# Patient Record
Sex: FEMALE
[De-identification: names, ages, dates, MRNs, and addresses within clinical notes are randomized per-mention and may not be internally consistent; named-entity substitution may affect disease eponyms.]

---

## 2018-01-02 ENCOUNTER — HOSPITAL ENCOUNTER (OUTPATIENT)
Dept: HOSPITAL 45 - C.MAMM | Age: 43
Discharge: HOME | End: 2018-01-02
Attending: OBSTETRICS & GYNECOLOGY
Payer: COMMERCIAL

## 2018-01-02 DIAGNOSIS — Z12.31: Primary | ICD-10-CM

## 2018-01-03 NOTE — MAMMOGRAPHY REPORT
BILATERAL DIGITAL SCREENING MAMMOGRAM TOMOSYNTHESIS WITH CAD: 1/2/2018

CLINICAL HISTORY: Routine screening.  Patient has no complaints.  





TECHNIQUE:  Breast tomosynthesis in addition to standard 2D mammography was performed. Current study 
was also evaluated with a Computer Aided Detection (CAD) system.  



COMPARISON: None



BREAST COMPOSITION:  The tissue of both breasts is heterogeneously dense, which may obscure small mas
ses.  



FINDINGS:  There is an asymmetry in the middle one third of the right breast along the posterior nipp
le line on the CC view, thought to project superiorly based on the MLO view, for which additional spo
t compression tomosynthesis views and possible ultrasound are recommended.  A possible grouping of ca
lcifications in the 6:00 anterior left breast warrant additional spot magnification views.



No other suspicious masses, asymmetries, areas of architectural distortion or suspicious calcificatio
ns are seen bilaterally.



IMPRESSION:  ACR BI-RADS CATEGORY 0: INCOMPLETE EVALUATION:  NEED ADDITIONAL IMAGING EVALUATION

The asymmetry in the middle one third of the right breast along the posterior nipple line on the CC v
iew and microcalcifications in the 6:00 anterior left breast need additional imaging evaluation.



The patient will be called to schedule an appointment.  





Approximately 10% of breast cancers are not detected with mammography. A negative mammographic report
 should not delay biopsy if a clinically suggestive mass is present.



Meghna Haque M.D.          

ay/:1/2/2018 15:55:39  



Imaging Technologist: Marilee ESQUEDA)(ARABELLA), [facility]

letter sent: Addl Imaging 0  

BI-RADS Code: ACR BI-RADS Category 0: Incomplete Evaluation:  Need Additional Imaging Evaluation

## 2018-01-15 ENCOUNTER — HOSPITAL ENCOUNTER (OUTPATIENT)
Dept: HOSPITAL 45 - C.MAMM | Age: 43
Discharge: HOME | End: 2018-01-15
Attending: OBSTETRICS & GYNECOLOGY
Payer: COMMERCIAL

## 2018-01-15 ENCOUNTER — HOSPITAL ENCOUNTER (OUTPATIENT)
Dept: HOSPITAL 45 - C.PAPS | Age: 43
Discharge: HOME | End: 2018-01-15
Attending: OBSTETRICS & GYNECOLOGY
Payer: COMMERCIAL

## 2018-01-15 ENCOUNTER — HOSPITAL ENCOUNTER (OUTPATIENT)
Dept: HOSPITAL 45 - C.LAB1850 | Age: 43
Discharge: HOME | End: 2018-01-15
Attending: OBSTETRICS & GYNECOLOGY
Payer: COMMERCIAL

## 2018-01-15 DIAGNOSIS — Z01.419: Primary | ICD-10-CM

## 2018-01-15 DIAGNOSIS — N64.89: Primary | ICD-10-CM

## 2018-01-15 DIAGNOSIS — N93.9: Primary | ICD-10-CM

## 2018-01-15 DIAGNOSIS — R92.0: ICD-10-CM

## 2018-01-15 LAB
EOSINOPHIL NFR BLD AUTO: 232 K/UL (ref 130–400)
HCT VFR BLD CALC: 36.8 % (ref 37–47)
HGB BLD-MCNC: 12.4 G/DL (ref 12–16)
MCH RBC QN AUTO: 29.6 PG (ref 25–34)
MCHC RBC AUTO-ENTMCNC: 33.7 G/DL (ref 32–36)
MCV RBC AUTO: 87.8 FL (ref 80–100)
PMV BLD AUTO: 9.9 FL (ref 7.4–10.4)
RED CELL DISTRIBUTION WIDTH CV: 13.2 % (ref 11.5–14.5)
RED CELL DISTRIBUTION WIDTH SD: 42.7 FL (ref 36.4–46.3)
WBC # BLD AUTO: 7.18 K/UL (ref 4.8–10.8)

## 2018-01-16 NOTE — MAMMOGRAPHY REPORT
BILATERAL DIGITAL DIAGNOSTIC MAMMOGRAM TOMOSYNTHESIS AND TARGETED RIGHT ULTRASOUND: 1/15/2018

CLINICAL HISTORY: 42-year-old woman called back from screening mammography for right breast asymmetry
 and left breast grouped microcalcifications.  Prior mammograms performed in 2004 and no longer avail
able therefore most recent screening mammogram is considered new baseline.  Family history of breast 
cancer = mother at age 55.  





TECHNIQUE: Spot magnification left CC, ML and spot compression CC and MLO 2-D and tomosynthesis views
 of the right breast were obtained.



COMPARISON: Comparison is made to exams dated:  1/2/2018 mammogram - [facility], 12
/28/2006 mammogram, and 9/20/2005 mammogram.   



BREAST COMPOSITION:  The tissue of both breasts is heterogeneously dense, which may obscure small mas
ses.  



FINDINGS: The spot magnification views of the left breast demonstrate a grouping of 6 round microcalc
ifications in the 6:00 anterior aspect of the breast.  Other scattered and loosely grouped round and 
coarse calcifications are seen in the visualized left breast.  These calcifications are probably ila
gn but given that no prior mammograms are available to document stability, a short interval follow-up
 left diagnostic mammogram including spot magnification views is recommended in 6 months.  



The spot compression views of the right breast demonstrate partial effacement of the asymmetry in the
 middle one third of the breast along the posterior nipple line on the CC view.  There is no definite
 associated architectural distortion or calcification.  No corresponding abnormality is identified on
 the spot compression MLO view.  Based on the tomosynthesis localizer bar, the asymmetry is located i
n the inferior breast and further evaluation with ultrasound was performed.



Real-time high-resolution sonographic evaluation was performed in the inferior right breast 5:00, 6:0
0 and 7:00 axes, and also in the retroareolar and 12:00 axes of the right breast.  There is a benign 
anechoic simple cyst in the 7:00 right breast, 5 cm from the nipple, measuring 4.2 x 2.5 x 6.0 mm, th
at is incidentally identified.  No other suspicious solid or cystic mass is seen.  The mammographic a
symmetry most likely represents the patient's baseline glandular pattern but a short interval follow-
up right diagnostic tomosynthesis mammogram and possible ultrasound is recommended to ensure stabilit
y in 6 months.



IMPRESSION:  ACR-BI-RADS CATEGORY 3: PROBABLY BENIGN, TARGETED ULTRASOUND ACR-BI-RADS CATEGORY 3: PRO
BABLY BENIGN 

1.  The right breast asymmetry in the middle one third of the breast along the posterior nipple line 
on the cc view has no suspicious sonographic correlate.  There is no associated architectural distort
ion or calcification.  This most likely represent the patient's baseline parenchymal pattern.  Lobo mayberry, given that no prior mammograms are available to document stability, a short interval follow-up rig
ht diagnostic tomosynthesis mammogram and possible ultrasound is recommended in 6 months.

2.  There is a small grouping of approximately 6 round monomorphic microcalcifications in the 6:00 an
terior left breast that most likely represent benign fibrocystic change and a few other scattered and
 loosely grouped similar appearing microcalcifications are also seen in the left breast.  However, gi
noé that no prior mammograms are available to document stability, a short interval follow-up left smooth
gnostic tomosynthesis mammogram including spot magnification views is recommended in 6 months.  



These results and recommendations were discussed with the patient at the time of the exam.  She tenta
tively scheduled a six-month follow-up appointment prior to leaving our department.







Approximately 10% of breast cancers are not detected with mammography. A negative mammographic report
 should not delay biopsy if a clinically suggestive mass is present.



Meghna Haque M.D.          

ay/:1/15/2018 15:09:56  



Imaging Technologist: Janice ESQUEDA)(ARABELLA), [facility]

letter sent: Follow Up Recommended 3  

BI-RADS Code: ACR-BI-RADS Category 3: Probably Benign  Ultrasound BI-RADS: ACR-BI-RADS Category 3: Pr
obably Benign